# Patient Record
Sex: MALE | Race: BLACK OR AFRICAN AMERICAN | NOT HISPANIC OR LATINO | Employment: UNEMPLOYED | URBAN - METROPOLITAN AREA
[De-identification: names, ages, dates, MRNs, and addresses within clinical notes are randomized per-mention and may not be internally consistent; named-entity substitution may affect disease eponyms.]

---

## 2023-03-10 ENCOUNTER — TELEPHONE (OUTPATIENT)
Dept: UROLOGY | Facility: CLINIC | Age: 58
End: 2023-03-10
Payer: MEDICARE

## 2023-03-10 NOTE — TELEPHONE ENCOUNTER
Oncology nurse navigator contacted patient's guardian, Babs, for rescheduling self referral. Imaging scan completed today in [place], imaging disc will be ready on 3/14 for patient. All other records requested. Tentative appointment scheduled with Dr. Hudson on 3/20.  Date, time, and location discussed. All questions/concerns addressed. Verbalized understanding. Contact information reviewed for further assistance.

## 2023-03-13 ENCOUNTER — TELEPHONE (OUTPATIENT)
Dept: HEMATOLOGY/ONCOLOGY | Facility: CLINIC | Age: 58
End: 2023-03-13
Payer: MEDICARE

## 2023-03-20 ENCOUNTER — TELEPHONE (OUTPATIENT)
Dept: UROLOGY | Facility: CLINIC | Age: 58
End: 2023-03-20

## 2023-03-20 ENCOUNTER — OFFICE VISIT (OUTPATIENT)
Dept: UROLOGY | Facility: CLINIC | Age: 58
End: 2023-03-20
Payer: MEDICARE

## 2023-03-20 VITALS
BODY MASS INDEX: 25.11 KG/M2 | WEIGHT: 160 LBS | HEART RATE: 61 BPM | HEIGHT: 67 IN | SYSTOLIC BLOOD PRESSURE: 153 MMHG | DIASTOLIC BLOOD PRESSURE: 76 MMHG

## 2023-03-20 DIAGNOSIS — C61 PROSTATE CANCER: Primary | ICD-10-CM

## 2023-03-20 PROCEDURE — 1159F MED LIST DOCD IN RCRD: CPT | Mod: CPTII,S$GLB,, | Performed by: STUDENT IN AN ORGANIZED HEALTH CARE EDUCATION/TRAINING PROGRAM

## 2023-03-20 PROCEDURE — 3078F DIAST BP <80 MM HG: CPT | Mod: CPTII,S$GLB,, | Performed by: STUDENT IN AN ORGANIZED HEALTH CARE EDUCATION/TRAINING PROGRAM

## 2023-03-20 PROCEDURE — 3077F SYST BP >= 140 MM HG: CPT | Mod: CPTII,S$GLB,, | Performed by: STUDENT IN AN ORGANIZED HEALTH CARE EDUCATION/TRAINING PROGRAM

## 2023-03-20 PROCEDURE — 99999 PR PBB SHADOW E&M-EST. PATIENT-LVL III: ICD-10-PCS | Mod: PBBFAC,,, | Performed by: STUDENT IN AN ORGANIZED HEALTH CARE EDUCATION/TRAINING PROGRAM

## 2023-03-20 PROCEDURE — 3077F PR MOST RECENT SYSTOLIC BLOOD PRESSURE >= 140 MM HG: ICD-10-PCS | Mod: CPTII,S$GLB,, | Performed by: STUDENT IN AN ORGANIZED HEALTH CARE EDUCATION/TRAINING PROGRAM

## 2023-03-20 PROCEDURE — 3008F BODY MASS INDEX DOCD: CPT | Mod: CPTII,S$GLB,, | Performed by: STUDENT IN AN ORGANIZED HEALTH CARE EDUCATION/TRAINING PROGRAM

## 2023-03-20 PROCEDURE — 1160F RVW MEDS BY RX/DR IN RCRD: CPT | Mod: CPTII,S$GLB,, | Performed by: STUDENT IN AN ORGANIZED HEALTH CARE EDUCATION/TRAINING PROGRAM

## 2023-03-20 PROCEDURE — 1159F PR MEDICATION LIST DOCUMENTED IN MEDICAL RECORD: ICD-10-PCS | Mod: CPTII,S$GLB,, | Performed by: STUDENT IN AN ORGANIZED HEALTH CARE EDUCATION/TRAINING PROGRAM

## 2023-03-20 PROCEDURE — 99204 PR OFFICE/OUTPT VISIT, NEW, LEVL IV, 45-59 MIN: ICD-10-PCS | Mod: S$GLB,,, | Performed by: STUDENT IN AN ORGANIZED HEALTH CARE EDUCATION/TRAINING PROGRAM

## 2023-03-20 PROCEDURE — 99999 PR PBB SHADOW E&M-EST. PATIENT-LVL III: CPT | Mod: PBBFAC,,, | Performed by: STUDENT IN AN ORGANIZED HEALTH CARE EDUCATION/TRAINING PROGRAM

## 2023-03-20 PROCEDURE — 3008F PR BODY MASS INDEX (BMI) DOCUMENTED: ICD-10-PCS | Mod: CPTII,S$GLB,, | Performed by: STUDENT IN AN ORGANIZED HEALTH CARE EDUCATION/TRAINING PROGRAM

## 2023-03-20 PROCEDURE — 3078F PR MOST RECENT DIASTOLIC BLOOD PRESSURE < 80 MM HG: ICD-10-PCS | Mod: CPTII,S$GLB,, | Performed by: STUDENT IN AN ORGANIZED HEALTH CARE EDUCATION/TRAINING PROGRAM

## 2023-03-20 PROCEDURE — 1160F PR REVIEW ALL MEDS BY PRESCRIBER/CLIN PHARMACIST DOCUMENTED: ICD-10-PCS | Mod: CPTII,S$GLB,, | Performed by: STUDENT IN AN ORGANIZED HEALTH CARE EDUCATION/TRAINING PROGRAM

## 2023-03-20 PROCEDURE — 99204 OFFICE O/P NEW MOD 45 MIN: CPT | Mod: S$GLB,,, | Performed by: STUDENT IN AN ORGANIZED HEALTH CARE EDUCATION/TRAINING PROGRAM

## 2023-03-20 RX ORDER — AMLODIPINE BESYLATE 10 MG/1
10 TABLET ORAL
COMMUNITY
Start: 2023-01-13 | End: 2024-01-13

## 2023-03-20 RX ORDER — CLINDAMYCIN PHOSPHATE 11.9 MG/ML
SOLUTION TOPICAL
COMMUNITY
Start: 2023-01-13

## 2023-03-20 NOTE — TELEPHONE ENCOUNTER
Oncology nurse navigator spoke with patient's guardian regarding radiation oncology referral. Inquiring if patient can be seen in [place] or a virtual visit. Will assess options and communicate availability with Ms. Flowers.

## 2023-03-20 NOTE — PROGRESS NOTES
[facility]  Urologic Oncology Clinic Note        Date of Service: 3/20/2023        Chief Complaint/Reason for Consultation: Favorable Intermediate Risk Prostate Cancer    Requesting Provider:   Bernice Lugo  No address on file      History of Present Illness:   Patient 57 y.o. male presents with new diagnosis of favorable intermediate risk prostate cancer from [facility]. Here for second opinion to discuss management strategy. Told previously options were brachytherapy, surgery or chemotherapy.    No family hx   No 5ARI use  No voiding complaints.   No ED  Not sexually active    Lives with his godmother, accompanied by Sabianist member.     No constitutional complaints. Not really interested in surgery. Considering radiation.    Urologic History:     1/20/2023 -- PSA 4.25  2/16/2023 -- TRUSBx -- 4/12 cores positive -- LM ASAP, LA 3+4 40% pattern 4 1/2 cores, RM 3+4 20% pattern 4 1/2 cores, RA 3+4  40% pattern 4 2/2 cores  3/10/2023 -- mpMRI Prostate -- PV 20cc, PSAD 0.2 -- Index lesion on right anterior, second lesion in left mid -- no PIRADS listed, MRI completed after biopsy -- no EPE or LND      Review of patient's allergies indicates:  No Known Allergies     No past medical history on file.   No past surgical history on file.   No family history on file.       Social History     Tobacco Use    Smoking status: Never    Smokeless tobacco: Never   Substance Use Topics    Alcohol use: Not on file        Review of Systems   Constitutional:  Negative for activity change, fatigue and fever.   HENT:  Negative for congestion.    Respiratory:  Negative for cough.    Cardiovascular:  Negative for chest pain.   Gastrointestinal:  Negative for abdominal pain.   Genitourinary:  Negative for dysuria, frequency and hematuria.           OBJECTIVE:     Vitals:    03/20/23 1314   BP: (!) 153/76   BP Location: Left arm   Patient Position: Sitting   BP Method: Large (Automatic)   Pulse: 61   Weight: 72.6 kg (160  "lb)   Height: 5' 7" (1.702 m)          General Appearance: Alert, cooperative, no distress  Head: Normocephalic  Eyes: Clear conjunctiva  Neck: No obvious LND or JVD  Lungs: Normal chest excursion, no accessory muscle use  Chest: Regular rate rhythm by palpation, no pedal edema  Abdomen: Soft, non-tender, non-distended, surgical scars none, hernias none  Male Genitalia: Deferred  Extremities: Atraumatic   Lymph Nodes: No appreciable lymph adenopathy  Neurologic: No gross gait, motor or sensory deficits            LAB:      1/13/2023 -- PSA 4.75, %free PSA 25  1/20/2023 -- PSA 4.25        IMAGING:      3/10/2023 (Outside institution MRI)  PROSTATE MRI  with and without  contrast     CLINICAL HISTORY: PSA 2.5 from November 2021, 4.75 from January 2023     Biopsy from February 2023 demonstrating adenocarcinoma left apex Marissa 3+4, right mid 3+4, right apex 3+4       TECHNIQUE: Multiparametric, multiplanar and multi-sequence MR imaging of the pelvis was performed utilizing torso phased array coil with and without intravenous contrast administration  using prostate cancer protocol.  High-resolution pre-contrast small   FOV imaging including T1 weighted, T2-weighted, DWI , ADC and post contrast dynamic perfusion imaging of the prostate along with dedicated post processing was also performed.       I.V. CONTRAST: ProHance,  20  cc     FINDINGS:   The prostate gland measures 4 x 3.2 x 2.9cm (Volume 20cc).     Peripheral zone:     First index lesion in the mid gland on the left 3:00 location demonstrating restricted diffusion measures 8 mm image 6 series 9 demonstrating restricted diffusion consistent with adenocarcinoma.     Second index lesion centered in the central/transition zone but also involving the peripheral zone on the right anteriorly 11:00 location measures 1.5 cm also series 6 image 9 demonstrates restricted diffusion consistent with adenocarcinoma.     There are large areas of increased precontrast T1 signal " consistent with postbiopsy hemorrhage     Transitional /central zone:     The second index lesion detailed above extends to involve the central/transition zone     There are multiple additional BPH nodules are noted definitive additional foci of adenocarcinoma     Extraprostatic extension / extracapsular invasion: The prostatic capsule is intact with no evidence of extracapsular disease.       Neurovascular bundle: Unremarkable.     Seminal vesicles: Unremarkable.     Lymphadenopathy: None.     Adjacent Organ Involvement: The urinary bladder is  unremarkable.  Bilateral lower ureters are normal in caliber.  The rectum is unremarkable.     Other Findings:No additional observations     Bones: No acute osseous abnormality.     IMPRESSION:          1.  Multifocal adenocarcinoma detailed above   2.  No definite extracapsular extent of disease, involvement of the neurovascular bundles or involvement of the seminal vesicles          ASSESSMENT/PLAN:     58 yo M w new diagnosis of favorable intermediate risk prostate cancer.     Plan:    We discussed the nature and clinical staging of his prostate cancer.  I told him that according to the [facility] tables, he has a 74% probability of organ-confined disease, a 25% chance of extracapsular extension, a 3% lymph node involvement, and a 2% chance of seminal vesicle invasion.  The treatment options for prostate cancer include but are not limited to expectant management, active surveillance, hormonal therapy (medical or surgical castration), interstitial radioactive seeds (brachytherapy), external beam radiotherapy, cryosurgery, HIFU, chemotherapy or radical prostatectomy.  The risks, benefits, possible complications and alternatives of each were discussed, compared and contrasted with one another.  Long-term obstructive or irritative urinary problems occur in a subset of patients following observation, active surveillance or radiation.  Temporary proctitis following radiation  persists in some patients long-term in a small but significant subset and is rare during observation, active surveillance or after prostatectomy.  Regarding radiation therapy, brachytherapy has similar effects as external beam radiotherapy with regard to erectile dysfunction and proctitis but can also exacerbate urinary obstructive symptoms.  Proton beam therapy offers no clinical advantage over other forms of definitive treatment. Erectile dysfunction occurs in many patients after radical prostatectomy or radiation, and that ejaculation will be lacking despite preserved ability to attain orgasm, whereas observation does not cause such sexual dysfunction, however sexual dysfunction can still decline over time in some men without prostate cancer treatment.  Whole gland cryotherapy is associated with worse sexual side effects and similar urinary side effects as those after radiotherapy. Temporary urinary incontinence occurs in most patients after prostatectomy and persists long-term in a small but significant subset, more than during observation, active surveillance or after radiation.       I told the patient that according to NCCN criteria, he has intermediate risk prostate cancer.  In particular he has favorable intermediate risk disease due to PSA <10 and PGG 2. The clinical implications of this were discussed.  Radical prostatectomy is associated with improved overall survival when compared to watchful waiting or observation strategies. In this risk group, patients are at higher risk for positive surgical margins or incomplete treatment, prostate cancer recurrence, progression, extraprostatic disease and the need for further treatment when compared to a low risk group.  The standard treatment options for this disease category include surgery, and radiation therapy plus androgen deprivation therapy (ADT). The use of ADT with radiation increases the likelihood and severity of adverse treatment-related events on  sexual function in most men and can cause other systemic side effects. For patients with favorable intermediate risk disease, prostate cancer can be treated with radiation alone, but the evidence basis is less robust than for combining radiotherapy with ADT.  Cryotherapy may also be considered as a treatment option for select patients with intermediate risk disease.  The patient understands that he may need multimodal therapy or other salvage therapies after primary treatment for his prostate cancer.  For example, if the patient elected radical prostatectomy, subsequent radiation therapy may be recommended if adverse pathologic features were identified. Additionally, for patients with unfavorable intermediate risk disease, staging studies are recommended, comprising either a MRI or CT and a bone scan.  Thus far the patient has had a mpMRI. The patient had the opportunity to ask questions regarding the implications of intermediate risk prostate cancer and these were answered.     Patients can become proactively involved with their care via modifiable health-related behaviors and/or risk factors.  Smoking and obesity may adversely impact treatment outcomes and increase the risk of general surgical complications in those men electing therapy for prostate cancer.  Smoking in general is associated with poor erectile function and urinary problems.  Thus, exercise, a balanced wholesome diet and abstinence from these and other negative modifiable health-related behaviors is recommended.      We then discussed the risks and benefits of radical prostatectomy and pelvic lymphadenectomy. Prostatectomy can relieve preoperative urinary obstructive symptoms.  Pelvic lymph node dissection is recommended for those with unfavorable intermediate risk or high risk disease. We discussed both immediate and long-term side effects of surgery.  The potential risks include but are not limited to bleeding/transfusion, infection, reaction to  anesthesia, heart attack, deep venous thrombosis/pulmonary embolus, death or other adverse medical outcome, anastomotic stricture, incontinence, impotence, rectal injury or fistula with need for colostomy/additional surgery, injury to surrounding structures (nerves, vessels, ureter, bowel/bowel obstruction, etc.), failure to completely eradicate the tumor/positive margins or the need for further therapy.  I told him that after surgery, his libido should be unchanged, he may or may not have normal spontaneous erections but that he would likely have a dry orgasm.  I told him that additional procedures may be necessary to address strictures, incontinence, impotence, rectal fistula, drainage of a lymphocele, etc. Younger (<65 years of age) or healthier (>10 year life expectancy) men are more likely to experience cancer control benefits from prostatectomy than older men.  Older men experience higher rates of permanent erectile dysfunction and urinary incontinence after prostatectomy compared to younger men. For men who are found to have locally extensive cancer with prostatectomy, additional radiation therapy may be recommended.  I gave him the option of discussing radiation treatment options with our radiation oncologist or cancer therapies with our genitourinary oncologist.  He had the opportunity to ask questions and his questions were answered to his satisfaction.  I thought he would be a good candidate for RALP     We also spoke about the usual preoperative care, the bowel prep, the usual course of hospitalization and postoperative care.  If he decides to undergo surgery, most men will need to abstain from NSAIDs and blood thinners usually two weeks prior to surgery but that also depends on the medication and the particular clinical situation.  I told him that if he has any concerns about temporarily discontinuing his anticoagulants, he will need to take the initiative to discuss this with his internist,  cardiologist or prescriber of those medications/anticoagulants.       We did encourage the patient to obtain an independent opinion from each of medical oncology and radiation oncology, and did offer to place a referral.      Ultimately, we did inform the patient that selection of a management strategy for localized prostate cancer should incorporate shared decision-making along with cancer severity (intermediate risk category as discussed), patient values and preferences, life expectancy [also encouraged to seek advice from internist/primary care], pre-treatment general functional and genitourinary symptoms [documented above], expected post-treatment functional status and potential for salvage treatment.      He will consider his options and contact me if he wishes to pursue surgical treatment or discuss this further.  I offered him literature to help with his decision-making.      Today radiation oncology referral was placed. Patient likely not interested in surgery though I did tell him most would consider this a first choice. I did inform him that oncologic on control with either would be equivalent and that either surgery or radiation would be a good choice. I told him it would be hard for him to make a bad choice here in terms of treatment.     RTC PRN      The total time for the new patient visit was at least 45 minutes in both face-to-face and non-face-to-face activities, which included chart review, interpretation of results, counseling, education, ordering meds/tests/procedures, and/or coordination of care.       Milad Hudson MD  Urologic Oncology  P: [phone]

## 2023-03-23 ENCOUNTER — TELEPHONE (OUTPATIENT)
Dept: RADIATION ONCOLOGY | Facility: CLINIC | Age: 58
End: 2023-03-23
Payer: MEDICARE

## 2023-03-23 NOTE — TELEPHONE ENCOUNTER
Outgoing call placed to Ms. Babs Flowers, relayed to her that our radiation oncologist here at [facility] is not licensed in [place] for a virtual consult. Awaiting radiation oncology department in [place] for next available consult at their location. Verbalized understanding and thanked nurse for update.

## 2023-04-03 ENCOUNTER — TELEPHONE (OUTPATIENT)
Dept: HEMATOLOGY/ONCOLOGY | Facility: CLINIC | Age: 58
End: 2023-04-03
Payer: MEDICARE

## 2023-04-03 ENCOUNTER — OFFICE VISIT (OUTPATIENT)
Dept: RADIATION ONCOLOGY | Facility: CLINIC | Age: 58
End: 2023-04-03
Payer: MEDICARE

## 2023-04-03 VITALS
RESPIRATION RATE: 18 BRPM | HEART RATE: 61 BPM | TEMPERATURE: 98 F | SYSTOLIC BLOOD PRESSURE: 163 MMHG | HEIGHT: 67 IN | DIASTOLIC BLOOD PRESSURE: 83 MMHG | WEIGHT: 154.31 LBS | OXYGEN SATURATION: 98 % | BODY MASS INDEX: 24.22 KG/M2

## 2023-04-03 DIAGNOSIS — C61 PROSTATE CANCER: ICD-10-CM

## 2023-04-03 DIAGNOSIS — C61 MALIGNANT NEOPLASM OF PROSTATE: ICD-10-CM

## 2023-04-03 PROCEDURE — 99999 PR PBB SHADOW E&M-EST. PATIENT-LVL III: ICD-10-PCS | Mod: PBBFAC,,, | Performed by: RADIOLOGY

## 2023-04-03 PROCEDURE — 3079F PR MOST RECENT DIASTOLIC BLOOD PRESSURE 80-89 MM HG: ICD-10-PCS | Mod: CPTII,S$GLB,, | Performed by: RADIOLOGY

## 2023-04-03 PROCEDURE — 1159F PR MEDICATION LIST DOCUMENTED IN MEDICAL RECORD: ICD-10-PCS | Mod: CPTII,S$GLB,, | Performed by: RADIOLOGY

## 2023-04-03 PROCEDURE — 3008F BODY MASS INDEX DOCD: CPT | Mod: CPTII,S$GLB,, | Performed by: RADIOLOGY

## 2023-04-03 PROCEDURE — 1159F MED LIST DOCD IN RCRD: CPT | Mod: CPTII,S$GLB,, | Performed by: RADIOLOGY

## 2023-04-03 PROCEDURE — 99205 PR OFFICE/OUTPT VISIT, NEW, LEVL V, 60-74 MIN: ICD-10-PCS | Mod: S$GLB,,, | Performed by: RADIOLOGY

## 2023-04-03 PROCEDURE — 99205 OFFICE O/P NEW HI 60 MIN: CPT | Mod: S$GLB,,, | Performed by: RADIOLOGY

## 2023-04-03 PROCEDURE — 3077F SYST BP >= 140 MM HG: CPT | Mod: CPTII,S$GLB,, | Performed by: RADIOLOGY

## 2023-04-03 PROCEDURE — 99999 PR PBB SHADOW E&M-EST. PATIENT-LVL III: CPT | Mod: PBBFAC,,, | Performed by: RADIOLOGY

## 2023-04-03 PROCEDURE — 3008F PR BODY MASS INDEX (BMI) DOCUMENTED: ICD-10-PCS | Mod: CPTII,S$GLB,, | Performed by: RADIOLOGY

## 2023-04-03 PROCEDURE — 3079F DIAST BP 80-89 MM HG: CPT | Mod: CPTII,S$GLB,, | Performed by: RADIOLOGY

## 2023-04-03 PROCEDURE — 3077F PR MOST RECENT SYSTOLIC BLOOD PRESSURE >= 140 MM HG: ICD-10-PCS | Mod: CPTII,S$GLB,, | Performed by: RADIOLOGY

## 2023-04-03 NOTE — PROGRESS NOTES
04/03/2023    [facility]   Radiation Oncology Consultation    Assessment   This is a 57 y.o. y/o male with Grade Group 2, PSA 4.75, favorable intermediate risk adenocarcinoma of the prostate.  He presents today to discuss definitve management with radiation therapy.        Plan     Treatment options were discussed with the patient including radical prostatectomy, radiation therapy with EBRT/SBRT, or brachytherapy.  In particular, we discussed prostatectomy as first line therapy given his young age and organ-confined disease, reserving radiation therapy for the unlikely event of local recurrence.  He understands that surgery is generally not an option, or carries much greater risk, after radiation.   We discussed the goals of treatment to be curative.  The risks, benefits, scheduling, alternatives to and rationale of radiation therapy were explained in detail.    Indication, course, and potential toxicities of pelvic radiation therapy reviewed in detail, including but not limited to fatigue, increased frequency, urgency, or pain with urination, increased frequency or urgency of bowel movements, diarrhea, pelvic bone fragility, erectile dysfunction, decreased volume of ejaculate, remote risk of secondary malignancy.   After this discussion, he elected to proceed with considering his options.  If he wishes to be treated closer to home in [place], he will need a referral to a local Radiation Oncologist from his primary Urologist.   If he opts for SBRT, referral to Dr. Oseguera for SpaceOAR and fiducials, followed 1 week later by MRI and CT sim.   Consider Decipher score     Will discuss medication for nocturia with his primary Urologist  He was given our contact information, and he was told that he could call our clinic at any time if he has any questions or concerns.      Radiation Treatment Details:   We plan to treat the prostate to a dose of 36.25 Gy in 5 fractions at 7.25 Gy per fraction  delivered daily  We will utilize a(n) SBRT technique.   IMRT is medically necessary to treat complex dose painted target volumes in the prostate region with concave and convex isodose lines with steep isodose gradients to spare multiple adjacent organs at risk including the rectum, bladder, penile bulb   We will utilize daily CT or orthogonal image guidance due to the need for accurate daily patient alignment to treat the target volumes accurately and avoid radiation overdose to multiple regional organs at risk since we are treating the patient with complex target volumes with multiple steep isodose gradients.          Chief Complaint   Patient presents with    Prostate Cancer         Oncology History   Malignant neoplasm of prostate   4/3/2023 Initial Diagnosis    Malignant neoplasm of prostate       4/3/2023 Cancer Staged    Staging form: Prostate, AJCC 8th Edition  - Clinical stage from 4/3/2023: Stage IIB (cT1c, cN0, cM0, PSA: 4.8, Grade Group: 2)           HPI: The patient is a 57 year old male with a diagnosis of prostate cancer.  He was noted to have an elevated PSA of 4.25 on 1/20/23.  Previous PSA history as follows:  1/13/23 4.75  12/2/22 4.27  11/29/21 2.5    On 2/16/23 he underwent TRUS-guided biopsy of the prostate in [place], with pathology as follows:    Left apex: Group 2 (40% pattern 4) in 10% of 1 of 2 cores  Right mid: Group 2 (20% pattern 4) in 10% of 1 of 2 cores  Right apex: Group 2 (40% pattern 4) in 20% of 2 of 2 cores  Total 4/12 cores, no PNI noted    3/10/23 MRI prostate noted:   Lesion 1: mid gland left 3:00 8mm  Lesion 2: central/transition zone and periph zone on right anterior 11:00 1.5cm  No EPE or SVI    3/17/23 Bone scan: Asymmetric uptake left L5 on posterior projection, possibly 2/2 asymmetric facet arthopathy vs metastatic deposit    Prostate size estimated at 20cc.  He presents today to discuss the potential role of radiation therapy in his cancer care.  IPSS is 13, with  "+frequency, +urgency, and nocturia x 5.  He reports moderat erectile difficulty at baseline.     History of prior irradiation: No  History of prior systemic anti-cancer therapy: No  History of collagen vascular disease: No  Implanted electronic device (pacer/defib/nerve stimulator): No    No past medical history on file.    No past surgical history on file.    Social History     Tobacco Use    Smoking status: Never    Smokeless tobacco: Never       Cancer-related family history is not on file.    Current Outpatient Medications on File Prior to Visit   Medication Sig Dispense Refill    amLODIPine (NORVASC) 10 MG tablet Take 10 mg by mouth.      clindamycin (CLEOCIN T) 1 % external solution Apply topically.      docosahexaenoic acid-epa 120-180 mg Cap Take 1,000 mg by mouth once daily.       No current facility-administered medications on file prior to visit.       Review of patient's allergies indicates:  No Known Allergies    Review of Systems   Constitutional:  Negative for chills and fever.   Eyes:  Negative for double vision.   Gastrointestinal:  Negative for vomiting.   Genitourinary:  Positive for frequency and urgency. Negative for dysuria and hematuria.   Musculoskeletal:  Negative for back pain.   Neurological:  Positive for speech change (stutter at baseline).      Vital Signs: BP (!) 163/83 (BP Location: Right arm, Patient Position: Sitting)   Pulse 61   Temp 98.1 °F (36.7 °C)   Resp 18   Ht 5' 7" (1.702 m)   Wt 70 kg (154 lb 5.2 oz)   SpO2 98%   BMI 24.17 kg/m²     ECOG Performance Status: 1 - Ambulates, capable of light work    Physical Exam  Vitals and nursing note reviewed.   Constitutional:       General: He is not in acute distress.     Appearance: Normal appearance. He is not ill-appearing or toxic-appearing.   HENT:      Head: Normocephalic and atraumatic.      Nose: Nose normal.   Eyes:      Extraocular Movements: Extraocular movements intact.      Conjunctiva/sclera: Conjunctivae normal. "      Pupils: Pupils are equal, round, and reactive to light.   Pulmonary:      Effort: Pulmonary effort is normal.   Musculoskeletal:         General: Normal range of motion.      Cervical back: Normal range of motion and neck supple.   Skin:     General: Skin is warm.   Neurological:      General: No focal deficit present.      Mental Status: He is alert and oriented to person, place, and time.      Cranial Nerves: No cranial nerve deficit.      Sensory: No sensory deficit.      Motor: No weakness.      Gait: Gait normal.   Psychiatric:         Mood and Affect: Mood normal.         Behavior: Behavior normal.         Thought Content: Thought content normal.         Judgment: Judgment normal.          Imaging: I have personally reviewed the patient's available images and reports and summarized pertinent findings above in HPI.     Pathology: I have personally reviewed the patient's available pathology and summarized pertinent findings above in HPI.         - Thank you for allowing me to participate in the care of your patient.    Rosalina Purcell MD

## 2023-04-03 NOTE — TELEPHONE ENCOUNTER
----- Message from Flora Lopez sent at 4/3/2023  3:50 PM CDT -----  Type: Needs Medical Advice  Who Called:  Ronit (friend)  Symptoms (please be specific):    How long has patient had these symptoms:    Pharmacy name and phone #:    Best Call Back Number:   Additional Information: Ronit is requesting a call back from the nurse regarding Mr. Cornejo.

## 2023-04-05 ENCOUNTER — TELEPHONE (OUTPATIENT)
Dept: UROLOGY | Facility: CLINIC | Age: 58
End: 2023-04-05
Payer: MEDICARE

## 2023-04-05 NOTE — TELEPHONE ENCOUNTER
I phoned the patient today.     I spoke with Ms Flowers his care giver    Patient was identified by two patient identifiers and asked if it was okay to speak about her medical care by phone before the conversation was commenced.     Patient aware and ok with me talking to his care giver. Patient opted for seed implant will send message to Dr. Mata.     Instructed patient to try behavioral modification for his voiding complaints as first next step.           Milad Hudson MD  Urologic Oncology  P: [phone]